# Patient Record
Sex: MALE | Race: WHITE | NOT HISPANIC OR LATINO | Employment: FULL TIME | URBAN - METROPOLITAN AREA
[De-identification: names, ages, dates, MRNs, and addresses within clinical notes are randomized per-mention and may not be internally consistent; named-entity substitution may affect disease eponyms.]

---

## 2017-04-26 ENCOUNTER — HISTORICAL (OUTPATIENT)
Dept: LAB | Facility: HOSPITAL | Age: 57
End: 2017-04-26

## 2018-09-01 ENCOUNTER — HISTORICAL (OUTPATIENT)
Dept: LAB | Facility: HOSPITAL | Age: 58
End: 2018-09-01

## 2018-09-01 LAB
ABS NEUT (OLG): 3.46
ALBUMIN SERPL-MCNC: 3.7 GM/DL (ref 3.4–5)
ALBUMIN/GLOB SERPL: 1.1 RATIO (ref 1.1–2)
ALP SERPL-CCNC: 99 UNIT/L (ref 46–116)
ALT SERPL-CCNC: 50 UNIT/L (ref 12–78)
AST SERPL-CCNC: 24 UNIT/L (ref 10–37)
BASOPHILS # BLD AUTO: 0.05 X10(3)/MCL
BASOPHILS NFR BLD AUTO: 1 %
BILIRUB SERPL-MCNC: 0.5 MG/DL (ref 0.2–1)
BILIRUBIN DIRECT+TOT PNL SERPL-MCNC: 0.12 MG/DL (ref 0–0.2)
BILIRUBIN DIRECT+TOT PNL SERPL-MCNC: 0.38 MG/DL
BUN SERPL-MCNC: 17 MG/DL (ref 7–18)
CALCIUM SERPL-MCNC: 9.3 MG/DL (ref 8.5–10.1)
CHLORIDE SERPL-SCNC: 105 MMOL/L (ref 98–107)
CHOLEST SERPL-MCNC: 234 MG/DL (ref 50–200)
CHOLEST/HDLC SERPL: 5 {RATIO} (ref 0–5)
CO2 SERPL-SCNC: 28.7 MMOL/L (ref 21–32)
CREAT SERPL-MCNC: 0.88 MG/DL (ref 0.7–1.3)
EOSINOPHIL # BLD AUTO: 0.24 X10(3)/MCL
EOSINOPHIL NFR BLD AUTO: 4.6 %
ERYTHROCYTE [DISTWIDTH] IN BLOOD BY AUTOMATED COUNT: 13 %
GLOBULIN SER-MCNC: 3.4 GM/DL (ref 2.4–3.5)
GLUCOSE SERPL-MCNC: 87 MG/DL (ref 74–106)
HCT VFR BLD AUTO: 46.2 % (ref 39–49)
HDLC SERPL-MCNC: 49 MG/DL (ref 35–60)
HGB BLD-MCNC: 15.8 GM/DL (ref 12.6–16.6)
IMM GRANULOCYTES # BLD AUTO: 0 10*3/UL (ref 0–0.1)
IMM GRANULOCYTES NFR BLD AUTO: 0 % (ref 0–1)
LDLC SERPL CALC-MCNC: 172 MG/DL (ref 50–140)
LYMPHOCYTES # BLD AUTO: 1.09 X10(3)/MCL
LYMPHOCYTES NFR BLD AUTO: 21 %
MCH RBC QN AUTO: 29.8 PG (ref 27–33)
MCHC RBC AUTO-ENTMCNC: 34.2 GM/DL (ref 32–35)
MCV RBC AUTO: 87 FL (ref 84–97)
MONOCYTES # BLD AUTO: 0.34 X10(3)/MCL
MONOCYTES NFR BLD AUTO: 6.6 %
NEUTROPHILS # BLD AUTO: 3.46 X10(3)/MCL
NEUTROPHILS NFR BLD AUTO: 66.8 %
PLATELET # BLD AUTO: 233 X10(3)/MCL (ref 151–368)
PMV BLD AUTO: 12 FL
POTASSIUM SERPL-SCNC: 4.2 MMOL/L (ref 3.5–5.1)
PROT SERPL-MCNC: 7.1 GM/DL (ref 6.4–8.2)
PSA SERPL-MCNC: 4.59 NG/ML (ref 0–4)
RBC # BLD AUTO: 5.31 X10(6)/MCL (ref 4.3–5.6)
SODIUM SERPL-SCNC: 140 MMOL/L (ref 136–145)
TRIGL SERPL-MCNC: 66 MG/DL (ref 30–150)
VLDLC SERPL CALC-MCNC: 13 MG/DL
WBC # SPEC AUTO: 5.18 X10(3)/MCL (ref 3.4–9.2)

## 2019-04-24 ENCOUNTER — HISTORICAL (OUTPATIENT)
Dept: LAB | Facility: HOSPITAL | Age: 59
End: 2019-04-24

## 2019-04-24 LAB
ABS NEUT (OLG): 3.34
ALBUMIN SERPL-MCNC: 4 GM/DL (ref 3.4–5)
ALBUMIN/GLOB SERPL: 1.2 RATIO (ref 1.1–2)
ALP SERPL-CCNC: 94 UNIT/L (ref 46–116)
ALT SERPL-CCNC: 36 UNIT/L (ref 12–78)
APTT PPP: 24.7 SECOND(S) (ref 24.5–32.8)
AST SERPL-CCNC: 26 UNIT/L (ref 10–37)
BASOPHILS # BLD AUTO: 0.05 X10(3)/MCL
BASOPHILS NFR BLD AUTO: 1 %
BILIRUB SERPL-MCNC: 0.5 MG/DL (ref 0.2–1)
BILIRUBIN DIRECT+TOT PNL SERPL-MCNC: 0.12 MG/DL (ref 0–0.2)
BILIRUBIN DIRECT+TOT PNL SERPL-MCNC: 0.38 MG/DL
BUN SERPL-MCNC: 15 MG/DL (ref 7–18)
CALCIUM SERPL-MCNC: 9.5 MG/DL (ref 8.5–10.1)
CHLORIDE SERPL-SCNC: 106 MMOL/L (ref 98–107)
CO2 SERPL-SCNC: 31 MMOL/L (ref 21–32)
CREAT SERPL-MCNC: 0.91 MG/DL (ref 0.7–1.3)
EOSINOPHIL # BLD AUTO: 0.24 X10(3)/MCL
EOSINOPHIL NFR BLD AUTO: 4.7 %
ERYTHROCYTE [DISTWIDTH] IN BLOOD BY AUTOMATED COUNT: 13 %
GLOBULIN SER-MCNC: 3.3 GM/DL (ref 2.4–3.5)
GLUCOSE SERPL-MCNC: 93 MG/DL (ref 74–106)
HCT VFR BLD AUTO: 47 % (ref 39–49)
HGB BLD-MCNC: 15.7 GM/DL (ref 12.6–16.6)
IMM GRANULOCYTES # BLD AUTO: 0.01 10*3/UL (ref 0–0.1)
IMM GRANULOCYTES NFR BLD AUTO: 0.2 % (ref 0–1)
INR PPP: 1.1
LYMPHOCYTES # BLD AUTO: 1.02 X10(3)/MCL
LYMPHOCYTES NFR BLD AUTO: 20.2 %
MCH RBC QN AUTO: 29.6 PG (ref 27–33)
MCHC RBC AUTO-ENTMCNC: 33.4 GM/DL (ref 32–35)
MCV RBC AUTO: 88.7 FL (ref 84–97)
MONOCYTES # BLD AUTO: 0.4 X10(3)/MCL
MONOCYTES NFR BLD AUTO: 7.9 %
NEUTROPHILS # BLD AUTO: 3.34 X10(3)/MCL
NEUTROPHILS NFR BLD AUTO: 66 %
PLATELET # BLD AUTO: 264 X10(3)/MCL (ref 151–368)
PMV BLD AUTO: 11 FL
POTASSIUM SERPL-SCNC: 4.7 MMOL/L (ref 3.5–5.1)
PROT SERPL-MCNC: 7.3 GM/DL (ref 6.4–8.2)
PROTHROMBIN TIME: 10.2 SECOND(S) (ref 8.6–10.1)
RBC # BLD AUTO: 5.3 X10(6)/MCL (ref 4.3–5.6)
SODIUM SERPL-SCNC: 142 MMOL/L (ref 136–145)
WBC # SPEC AUTO: 5.06 X10(3)/MCL (ref 3.4–9.2)

## 2021-12-13 ENCOUNTER — HISTORICAL (OUTPATIENT)
Dept: LAB | Facility: HOSPITAL | Age: 61
End: 2021-12-13

## 2021-12-13 LAB
ABS NEUT (OLG): 3.12
ALBUMIN SERPL-MCNC: 3.9 GM/DL (ref 3.5–5)
ALBUMIN/GLOB SERPL: 1.4 RATIO (ref 1.1–2)
ALP SERPL-CCNC: 90 UNIT/L (ref 40–150)
ALT SERPL-CCNC: 24 UNIT/L (ref 0–55)
AST SERPL-CCNC: 20 UNIT/L (ref 5–34)
BASOPHILS # BLD AUTO: 0.03 X10(3)/MCL
BASOPHILS NFR BLD AUTO: 0.7 %
BILIRUB SERPL-MCNC: 0.4 MG/DL
BILIRUBIN DIRECT+TOT PNL SERPL-MCNC: 0.2 MG/DL
BILIRUBIN DIRECT+TOT PNL SERPL-MCNC: 0.2 MG/DL (ref 0–0.5)
BUN SERPL-MCNC: 14 MG/DL (ref 8.4–25.7)
CALCIUM SERPL-MCNC: 9.5 MG/DL (ref 8.7–10.5)
CHLORIDE SERPL-SCNC: 106 MMOL/L (ref 98–107)
CHOLEST SERPL-MCNC: 220 MG/DL
CHOLEST/HDLC SERPL: 5 {RATIO} (ref 0–5)
CO2 SERPL-SCNC: 26 MEQ/L (ref 22–29)
CREAT SERPL-MCNC: 0.86 MG/DL (ref 0.73–1.18)
EOSINOPHIL # BLD AUTO: 0.15 X10(3)/MCL
EOSINOPHIL NFR BLD AUTO: 3.3 %
ERYTHROCYTE [DISTWIDTH] IN BLOOD BY AUTOMATED COUNT: 13 %
GLOBULIN SER-MCNC: 2.8 GM/DL (ref 2.4–3.5)
GLUCOSE SERPL-MCNC: 88 MG/DL (ref 74–100)
HCT VFR BLD AUTO: 47.1 % (ref 39–49)
HDLC SERPL-MCNC: 44 MG/DL (ref 35–60)
HGB BLD-MCNC: 15.3 GM/DL (ref 12.6–16.6)
IMM GRANULOCYTES # BLD AUTO: 0 10*3/UL (ref 0–0.1)
IMM GRANULOCYTES NFR BLD AUTO: 0 % (ref 0–1)
LDLC SERPL CALC-MCNC: 160 MG/DL (ref 50–140)
LYMPHOCYTES # BLD AUTO: 0.85 X10(3)/MCL
LYMPHOCYTES NFR BLD AUTO: 18.4 %
MCH RBC QN AUTO: 28.9 PG (ref 27–33)
MCHC RBC AUTO-ENTMCNC: 32.5 GM/DL (ref 32–35)
MCV RBC AUTO: 88.9 FL (ref 84–97)
MONOCYTES # BLD AUTO: 0.46 X10(3)/MCL
MONOCYTES NFR BLD AUTO: 10 %
NEUTROPHILS # BLD AUTO: 3.12 X10(3)/MCL
NEUTROPHILS NFR BLD AUTO: 67.6 %
PLATELET # BLD AUTO: 221 X10(3)/MCL (ref 140–450)
PMV BLD AUTO: 11 FL
POTASSIUM SERPL-SCNC: 4.2 MMOL/L (ref 3.5–5.1)
PROT SERPL-MCNC: 6.7 GM/DL (ref 6.4–8.3)
PSA SERPL-MCNC: <0.1 NG/ML
RBC # BLD AUTO: 5.3 X10(6)/MCL (ref 4.3–5.6)
SODIUM SERPL-SCNC: 140 MMOL/L (ref 136–145)
TRIGL SERPL-MCNC: 81 MG/DL (ref 34–140)
VLDLC SERPL CALC-MCNC: 16 MG/DL
WBC # SPEC AUTO: 4.61 X10(3)/MCL (ref 3.4–9.2)

## 2022-04-11 ENCOUNTER — HISTORICAL (OUTPATIENT)
Dept: ADMINISTRATIVE | Facility: HOSPITAL | Age: 62
End: 2022-04-11

## 2022-04-27 VITALS
BODY MASS INDEX: 26.67 KG/M2 | WEIGHT: 186.31 LBS | OXYGEN SATURATION: 99 % | SYSTOLIC BLOOD PRESSURE: 110 MMHG | HEIGHT: 70 IN | DIASTOLIC BLOOD PRESSURE: 67 MMHG

## 2022-11-27 RX ORDER — CLOTRIMAZOLE AND BETAMETHASONE DIPROPIONATE 10; .64 MG/G; MG/G
CREAM TOPICAL 2 TIMES DAILY
Qty: 45 G | Refills: 0 | Status: SHIPPED | OUTPATIENT
Start: 2022-11-27 | End: 2022-12-11

## 2022-11-30 DIAGNOSIS — Z11.4 ENCOUNTER FOR SCREENING FOR HIV: ICD-10-CM

## 2022-11-30 DIAGNOSIS — Z13.6 SCREENING FOR ISCHEMIC HEART DISEASE: ICD-10-CM

## 2022-11-30 DIAGNOSIS — Z12.5 SCREENING FOR MALIGNANT NEOPLASM OF PROSTATE: ICD-10-CM

## 2022-11-30 DIAGNOSIS — Z00.00 WELLNESS EXAMINATION: Primary | ICD-10-CM

## 2022-11-30 DIAGNOSIS — Z11.59 NEED FOR HEPATITIS C SCREENING TEST: ICD-10-CM

## 2022-12-12 ENCOUNTER — PATIENT OUTREACH (OUTPATIENT)
Dept: ADMINISTRATIVE | Facility: HOSPITAL | Age: 62
End: 2022-12-12
Payer: COMMERCIAL

## 2023-02-01 ENCOUNTER — LAB VISIT (OUTPATIENT)
Dept: LAB | Facility: HOSPITAL | Age: 63
End: 2023-02-01
Attending: FAMILY MEDICINE
Payer: COMMERCIAL

## 2023-02-01 DIAGNOSIS — Z12.5 SCREENING FOR MALIGNANT NEOPLASM OF PROSTATE: ICD-10-CM

## 2023-02-01 DIAGNOSIS — Z11.59 NEED FOR HEPATITIS C SCREENING TEST: ICD-10-CM

## 2023-02-01 DIAGNOSIS — Z00.00 WELLNESS EXAMINATION: ICD-10-CM

## 2023-02-01 DIAGNOSIS — Z13.6 SCREENING FOR ISCHEMIC HEART DISEASE: ICD-10-CM

## 2023-02-01 LAB
ALBUMIN SERPL-MCNC: 4.2 G/DL (ref 3.4–4.8)
ALBUMIN/GLOB SERPL: 1.4 RATIO (ref 1.1–2)
ALP SERPL-CCNC: 92 UNIT/L (ref 40–150)
ALT SERPL-CCNC: 32 UNIT/L (ref 0–55)
AST SERPL-CCNC: 24 UNIT/L (ref 5–34)
BASOPHILS # BLD AUTO: 0.05 X10(3)/MCL (ref 0–0.2)
BASOPHILS NFR BLD AUTO: 0.9 %
BILIRUBIN DIRECT+TOT PNL SERPL-MCNC: 0.6 MG/DL
BUN SERPL-MCNC: 17 MG/DL (ref 8.4–25.7)
CALCIUM SERPL-MCNC: 10.2 MG/DL (ref 8.8–10)
CHLORIDE SERPL-SCNC: 105 MMOL/L (ref 98–107)
CHOLEST SERPL-MCNC: 270 MG/DL
CHOLEST/HDLC SERPL: 5 {RATIO} (ref 0–5)
CO2 SERPL-SCNC: 30 MMOL/L (ref 23–31)
CREAT SERPL-MCNC: 0.87 MG/DL (ref 0.73–1.18)
EOSINOPHIL # BLD AUTO: 0.25 X10(3)/MCL (ref 0–0.9)
EOSINOPHIL NFR BLD AUTO: 4.7 %
ERYTHROCYTE [DISTWIDTH] IN BLOOD BY AUTOMATED COUNT: 12.6 % (ref 11.5–17)
GFR SERPLBLD CREATININE-BSD FMLA CKD-EPI: >60 MLS/MIN/1.73/M2
GLOBULIN SER-MCNC: 3.1 GM/DL (ref 2.4–3.5)
GLUCOSE SERPL-MCNC: 90 MG/DL (ref 82–115)
HCT VFR BLD AUTO: 50.4 % (ref 42–52)
HCV AB SERPL QL IA: NONREACTIVE
HDLC SERPL-MCNC: 51 MG/DL (ref 35–60)
HGB BLD-MCNC: 16.7 GM/DL (ref 14–18)
IMM GRANULOCYTES # BLD AUTO: 0.01 X10(3)/MCL (ref 0–0.04)
IMM GRANULOCYTES NFR BLD AUTO: 0.2 %
LDLC SERPL CALC-MCNC: 199 MG/DL (ref 50–140)
LYMPHOCYTES # BLD AUTO: 0.9 X10(3)/MCL (ref 0.6–4.6)
LYMPHOCYTES NFR BLD AUTO: 17 %
MCH RBC QN AUTO: 29.7 PG
MCHC RBC AUTO-ENTMCNC: 33.1 MG/DL (ref 33–36)
MCV RBC AUTO: 89.7 FL (ref 80–94)
MONOCYTES # BLD AUTO: 0.36 X10(3)/MCL (ref 0.1–1.3)
MONOCYTES NFR BLD AUTO: 6.8 %
NEUTROPHILS # BLD AUTO: 3.72 X10(3)/MCL (ref 2.1–9.2)
NEUTROPHILS NFR BLD AUTO: 70.4 %
NRBC BLD AUTO-RTO: 0 %
PLATELET # BLD AUTO: 255 X10(3)/MCL (ref 130–400)
PMV BLD AUTO: 11.3 FL (ref 7.4–10.4)
POTASSIUM SERPL-SCNC: 3.9 MMOL/L (ref 3.5–5.1)
PROT SERPL-MCNC: 7.3 GM/DL (ref 5.8–7.6)
PSA SERPL-MCNC: <0.02 NG/ML
RBC # BLD AUTO: 5.62 X10(6)/MCL (ref 4.7–6.1)
SODIUM SERPL-SCNC: 143 MMOL/L (ref 136–145)
TRIGL SERPL-MCNC: 100 MG/DL (ref 34–140)
VLDLC SERPL CALC-MCNC: 20 MG/DL
WBC # SPEC AUTO: 5.3 X10(3)/MCL (ref 4.5–11.5)

## 2023-02-01 PROCEDURE — 80053 COMPREHEN METABOLIC PANEL: CPT

## 2023-02-01 PROCEDURE — 80061 LIPID PANEL: CPT

## 2023-02-01 PROCEDURE — 84153 ASSAY OF PSA TOTAL: CPT

## 2023-02-01 PROCEDURE — 86803 HEPATITIS C AB TEST: CPT

## 2023-02-01 PROCEDURE — 85025 COMPLETE CBC W/AUTO DIFF WBC: CPT

## 2023-02-01 PROCEDURE — 36415 COLL VENOUS BLD VENIPUNCTURE: CPT

## 2023-02-01 RX ORDER — ROSUVASTATIN CALCIUM 5 MG/1
5 TABLET, COATED ORAL DAILY
Qty: 30 TABLET | Refills: 6 | Status: SHIPPED | OUTPATIENT
Start: 2023-02-01 | End: 2023-05-31 | Stop reason: SDUPTHER

## 2023-02-01 NOTE — PROGRESS NOTES
Please inform patient of results.     1. Rx for Crestor 5 mg q.h.s. sent to pharmacy.  Repeat FLP in 3 months    Other labwork within acceptable ranges.

## 2023-03-07 ENCOUNTER — OFFICE VISIT (OUTPATIENT)
Dept: FAMILY MEDICINE | Facility: CLINIC | Age: 63
End: 2023-03-07
Payer: COMMERCIAL

## 2023-03-07 VITALS
BODY MASS INDEX: 26.6 KG/M2 | HEIGHT: 70 IN | RESPIRATION RATE: 18 BRPM | WEIGHT: 185.81 LBS | SYSTOLIC BLOOD PRESSURE: 118 MMHG | HEART RATE: 61 BPM | DIASTOLIC BLOOD PRESSURE: 70 MMHG | TEMPERATURE: 97 F | OXYGEN SATURATION: 98 %

## 2023-03-07 DIAGNOSIS — C61 MALIGNANT NEOPLASM OF PROSTATE: ICD-10-CM

## 2023-03-07 DIAGNOSIS — Z00.00 WELLNESS EXAMINATION: Primary | ICD-10-CM

## 2023-03-07 PROCEDURE — 3008F BODY MASS INDEX DOCD: CPT | Mod: CPTII,,, | Performed by: FAMILY MEDICINE

## 2023-03-07 PROCEDURE — 3078F DIAST BP <80 MM HG: CPT | Mod: CPTII,,, | Performed by: FAMILY MEDICINE

## 2023-03-07 PROCEDURE — 3074F PR MOST RECENT SYSTOLIC BLOOD PRESSURE < 130 MM HG: ICD-10-PCS | Mod: CPTII,,, | Performed by: FAMILY MEDICINE

## 2023-03-07 PROCEDURE — 1160F RVW MEDS BY RX/DR IN RCRD: CPT | Mod: CPTII,,, | Performed by: FAMILY MEDICINE

## 2023-03-07 PROCEDURE — 1159F PR MEDICATION LIST DOCUMENTED IN MEDICAL RECORD: ICD-10-PCS | Mod: CPTII,,, | Performed by: FAMILY MEDICINE

## 2023-03-07 PROCEDURE — 3008F PR BODY MASS INDEX (BMI) DOCUMENTED: ICD-10-PCS | Mod: CPTII,,, | Performed by: FAMILY MEDICINE

## 2023-03-07 PROCEDURE — 1159F MED LIST DOCD IN RCRD: CPT | Mod: CPTII,,, | Performed by: FAMILY MEDICINE

## 2023-03-07 PROCEDURE — 3078F PR MOST RECENT DIASTOLIC BLOOD PRESSURE < 80 MM HG: ICD-10-PCS | Mod: CPTII,,, | Performed by: FAMILY MEDICINE

## 2023-03-07 PROCEDURE — 1160F PR REVIEW ALL MEDS BY PRESCRIBER/CLIN PHARMACIST DOCUMENTED: ICD-10-PCS | Mod: CPTII,,, | Performed by: FAMILY MEDICINE

## 2023-03-07 PROCEDURE — 99396 PREV VISIT EST AGE 40-64: CPT | Mod: ,,, | Performed by: FAMILY MEDICINE

## 2023-03-07 PROCEDURE — 99396 PR PREVENTIVE VISIT,EST,40-64: ICD-10-PCS | Mod: ,,, | Performed by: FAMILY MEDICINE

## 2023-03-07 PROCEDURE — 3074F SYST BP LT 130 MM HG: CPT | Mod: CPTII,,, | Performed by: FAMILY MEDICINE

## 2023-03-07 NOTE — PROGRESS NOTES
"Subjective:       Patient ID: Vinny Georges Jr is a 62 y.o. male.    Chief Complaint: Wellness      Wellness      Review of Systems   Constitutional: Negative.    Respiratory: Negative.     Cardiovascular: Negative.    Gastrointestinal: Negative.    Genitourinary:         Prostate cancer: Followed by Dr. Jernigan   Psychiatric/Behavioral: Negative.           Objective:      /70 (BP Location: Left arm, Patient Position: Sitting, BP Method: Large (Automatic))   Pulse 61   Temp 97.2 °F (36.2 °C)   Resp 18   Ht 5' 10" (1.778 m)   Wt 84.3 kg (185 lb 12.8 oz)   SpO2 98%   BMI 26.66 kg/m²    Physical Exam  Constitutional:       General: He is not in acute distress.     Appearance: Normal appearance.   Cardiovascular:      Rate and Rhythm: Normal rate and regular rhythm.   Pulmonary:      Effort: Pulmonary effort is normal.      Breath sounds: Normal breath sounds.   Abdominal:      General: Abdomen is flat. Bowel sounds are normal.      Palpations: Abdomen is soft.   Musculoskeletal:         General: Normal range of motion.   Neurological:      Mental Status: He is alert.   Psychiatric:         Mood and Affect: Mood normal.         Behavior: Behavior normal.         Thought Content: Thought content normal.         Judgment: Judgment normal.           Recent Results (from the past 2016 hour(s))   Comprehensive Metabolic Panel    Collection Time: 02/01/23  7:02 AM   Result Value Ref Range    Sodium Level 143 136 - 145 mmol/L    Potassium Level 3.9 3.5 - 5.1 mmol/L    Chloride 105 98 - 107 mmol/L    Carbon Dioxide 30 23 - 31 mmol/L    Glucose Level 90 82 - 115 mg/dL    Blood Urea Nitrogen 17.0 8.4 - 25.7 mg/dL    Creatinine 0.87 0.73 - 1.18 mg/dL    Calcium Level Total 10.2 (H) 8.8 - 10.0 mg/dL    Protein Total 7.3 5.8 - 7.6 gm/dL    Albumin Level 4.2 3.4 - 4.8 g/dL    Globulin 3.1 2.4 - 3.5 gm/dL    Albumin/Globulin Ratio 1.4 1.1 - 2.0 ratio    Bilirubin Total 0.6 <=1.5 mg/dL    Alkaline Phosphatase 92 40 - " 150 unit/L    Alanine Aminotransferase 32 0 - 55 unit/L    Aspartate Aminotransferase 24 5 - 34 unit/L    eGFR >60 mls/min/1.73/m2   Hepatitis C Antibody    Collection Time: 02/01/23  7:02 AM   Result Value Ref Range    Hepatitis C Antibody Nonreactive Nonreactive   Lipid Panel    Collection Time: 02/01/23  7:02 AM   Result Value Ref Range    Cholesterol Total 270 (H) <=200 mg/dL    HDL Cholesterol 51 35 - 60 mg/dL    Triglyceride 100 34 - 140 mg/dL    Cholesterol/HDL Ratio 5 0 - 5    Very Low Density Lipoprotein 20     LDL Cholesterol 199.00 (H) 50.00 - 140.00 mg/dL   PSA, Screening    Collection Time: 02/01/23  7:02 AM   Result Value Ref Range    Prostate Specific Antigen <0.02 <=4.00 ng/mL   CBC with Differential    Collection Time: 02/01/23  7:02 AM   Result Value Ref Range    WBC 5.3 4.5 - 11.5 x10(3)/mcL    RBC 5.62 4.70 - 6.10 x10(6)/mcL    Hgb 16.7 14.0 - 18.0 gm/dL    Hct 50.4 42.0 - 52.0 %    MCV 89.7 80.0 - 94.0 fL    MCH 29.7 pg    MCHC 33.1 33.0 - 36.0 mg/dL    RDW 12.6 11.5 - 17.0 %    Platelet 255 130 - 400 x10(3)/mcL    MPV 11.3 (H) 7.4 - 10.4 fL    Neut % 70.4 %    Lymph % 17.0 %    Mono % 6.8 %    Eos % 4.7 %    Basophil % 0.9 %    Lymph # 0.90 0.6 - 4.6 x10(3)/mcL    Neut # 3.72 2.1 - 9.2 x10(3)/mcL    Mono # 0.36 0.1 - 1.3 x10(3)/mcL    Eos # 0.25 0 - 0.9 x10(3)/mcL    Baso # 0.05 0 - 0.2 x10(3)/mcL    IG# 0.01 0 - 0.04 x10(3)/mcL    IG% 0.2 %    NRBC% 0.0 %      Assessment:       Problem List Items Addressed This Visit    None  Visit Diagnoses       Wellness examination    -  Primary    Malignant neoplasm of prostate                   Plan:   1. Wellness examination  Lab work discussed with patient  Continue current medication  Continue diet/exercise  Return to clinic with any concerns    2. Malignant neoplasm of prostate  Keep scheduled appointment with Dr Jernigan

## 2023-05-31 DIAGNOSIS — E78.6 CHOLESTEROL DEPLETION: Primary | ICD-10-CM

## 2023-05-31 RX ORDER — ROSUVASTATIN CALCIUM 5 MG/1
5 TABLET, COATED ORAL DAILY
Qty: 30 TABLET | Refills: 6 | Status: SHIPPED | OUTPATIENT
Start: 2023-05-31 | End: 2023-06-01 | Stop reason: SDUPTHER

## 2023-06-01 DIAGNOSIS — E78.6 CHOLESTEROL DEPLETION: ICD-10-CM

## 2023-06-01 RX ORDER — ROSUVASTATIN CALCIUM 5 MG/1
5 TABLET, COATED ORAL DAILY
Qty: 30 TABLET | Refills: 3 | Status: SHIPPED | OUTPATIENT
Start: 2023-06-01 | End: 2024-03-04

## 2023-06-15 ENCOUNTER — HOSPITAL ENCOUNTER (EMERGENCY)
Facility: HOSPITAL | Age: 63
Discharge: HOME | End: 2023-06-15
Attending: GENERAL PRACTICE
Payer: COMMERCIAL

## 2023-06-15 VITALS
HEIGHT: 70 IN | WEIGHT: 186 LBS | OXYGEN SATURATION: 97 % | DIASTOLIC BLOOD PRESSURE: 78 MMHG | RESPIRATION RATE: 16 BRPM | SYSTOLIC BLOOD PRESSURE: 148 MMHG | TEMPERATURE: 98 F | HEART RATE: 78 BPM | BODY MASS INDEX: 26.63 KG/M2

## 2023-06-15 DIAGNOSIS — S81.012A KNEE LACERATION, LEFT, INITIAL ENCOUNTER: Primary | ICD-10-CM

## 2023-06-15 PROCEDURE — 12004 RPR S/N/AX/GEN/TRK7.6-12.5CM: CPT

## 2023-06-15 PROCEDURE — 90471 IMMUNIZATION ADMIN: CPT | Performed by: GENERAL PRACTICE

## 2023-06-15 PROCEDURE — 99284 EMERGENCY DEPT VISIT MOD MDM: CPT | Mod: 25

## 2023-06-15 PROCEDURE — 90715 TDAP VACCINE 7 YRS/> IM: CPT | Performed by: GENERAL PRACTICE

## 2023-06-15 PROCEDURE — 25000003 PHARM REV CODE 250: Performed by: GENERAL PRACTICE

## 2023-06-15 PROCEDURE — 63600175 PHARM REV CODE 636 W HCPCS: Performed by: GENERAL PRACTICE

## 2023-06-15 RX ORDER — DOXYCYCLINE 100 MG/1
100 CAPSULE ORAL 2 TIMES DAILY
Qty: 20 CAPSULE | Refills: 0 | Status: SHIPPED | OUTPATIENT
Start: 2023-06-15 | End: 2023-06-25

## 2023-06-15 RX ORDER — DOXYCYCLINE HYCLATE 100 MG
100 TABLET ORAL
Status: COMPLETED | OUTPATIENT
Start: 2023-06-15 | End: 2023-06-15

## 2023-06-15 RX ORDER — LIDOCAINE HYDROCHLORIDE AND EPINEPHRINE 10; 10 MG/ML; UG/ML
10 INJECTION, SOLUTION INFILTRATION; PERINEURAL ONCE
Status: COMPLETED | OUTPATIENT
Start: 2023-06-15 | End: 2023-06-15

## 2023-06-15 RX ORDER — NABUMETONE 500 MG/1
500 TABLET, FILM COATED ORAL 2 TIMES DAILY PRN
Qty: 20 TABLET | Refills: 0 | Status: SHIPPED | OUTPATIENT
Start: 2023-06-15

## 2023-06-15 RX ADMIN — TETANUS TOXOID, REDUCED DIPHTHERIA TOXOID AND ACELLULAR PERTUSSIS VACCINE, ADSORBED 0.5 ML: 5; 2.5; 8; 8; 2.5 SUSPENSION INTRAMUSCULAR at 09:06

## 2023-06-15 RX ADMIN — DOXYCYCLINE HYCLATE 100 MG: 100 TABLET, COATED ORAL at 10:06

## 2023-06-15 RX ADMIN — LIDOCAINE HYDROCHLORIDE,EPINEPHRINE BITARTRATE 10 ML: 10; .01 INJECTION, SOLUTION INFILTRATION; PERINEURAL at 09:06

## 2023-06-16 NOTE — ED PROVIDER NOTES
Encounter Date: 6/15/2023       History     Chief Complaint   Patient presents with    Laceration     Pt arrived to ED via PV c/o laceration to lf knee, bleeding controlled, after being hit by a branch while cutting down a tree, pt not on anti-coagulation therapy at this time, last tetanus unknown     Pt arrived to ED via PV c/o laceration to lf knee, bleeding controlled, after being hit by a branch while cutting down a tree, pt not on anti-coagulation therapy at this time, last tetanus unknown    The history is provided by the patient.   Laceration   The incident occurred just prior to arrival. The laceration is located on the Left leg. The laceration is 9 cm in size. The laceration mechanism was a a clean knife and a dirty knife. The pain is at a severity of 4/10. The pain has been Constant since onset. He reports no foreign bodies present. His tetanus status is unknown.   Review of patient's allergies indicates:   Allergen Reactions    Penicillins      History reviewed. No pertinent past medical history.  Past Surgical History:   Procedure Laterality Date    PROSTATE SURGERY  06/01/2019    SHOULDER SURGERY Right 06/01/2020     History reviewed. No pertinent family history.  Social History     Tobacco Use    Smoking status: Never    Smokeless tobacco: Never   Substance Use Topics    Drug use: Never     Review of Systems   Constitutional: Negative.    HENT: Negative.     Eyes: Negative.    Respiratory: Negative.     Cardiovascular: Negative.    Gastrointestinal: Negative.    Endocrine: Negative.    Genitourinary: Negative.    Musculoskeletal: Negative.    Skin:  Positive for wound.   Allergic/Immunologic: Negative.    Neurological: Negative.    Hematological: Negative.    Psychiatric/Behavioral: Negative.     All other systems reviewed and are negative.    Physical Exam     Initial Vitals [06/15/23 2112]   BP Pulse Resp Temp SpO2   (!) 151/81 60 16 98 °F (36.7 °C) 97 %      MAP       --         Physical  Exam    Nursing note and vitals reviewed.  Constitutional: He appears well-developed and well-nourished.   HENT:   Head: Normocephalic and atraumatic.   Eyes: EOM are normal. Pupils are equal, round, and reactive to light.   Neck: Neck supple.   Normal range of motion.  Cardiovascular:  Normal rate, regular rhythm, normal heart sounds and intact distal pulses.           Abdominal: Abdomen is soft. Bowel sounds are normal.   Musculoskeletal:         General: Normal range of motion.      Cervical back: Normal range of motion and neck supple.     Neurological: He is alert and oriented to person, place, and time. He has normal strength. GCS score is 15. GCS eye subscore is 4. GCS verbal subscore is 5. GCS motor subscore is 6.   Skin: Skin is warm. Laceration noted.        9-10 cm linear laceration going through to the subcutaneous noted.  There does not appear to be any bony injury.  Full range of motion.  There is slightly contused edges and an abrasion inferiorly.   Psychiatric: He has a normal mood and affect. His behavior is normal. Judgment and thought content normal.       ED Course   Lac Repair    Date/Time: 6/15/2023 10:33 PM  Performed by: Jim Jaimes MD  Authorized by: Jim Jaimes MD     Consent:     Consent obtained:  Emergent situation    Consent given by:  Patient    Risks, benefits, and alternatives were discussed: yes      Risks discussed:  Infection, pain and poor cosmetic result    Alternatives discussed:  No treatment  Universal protocol:     Procedure explained and questions answered to patient or proxy's satisfaction: yes      Relevant documents present and verified: yes      Imaging studies available: yes      Site/side marked: yes      Immediately prior to procedure, a time out was called: yes      Patient identity confirmed:  Verbally with patient, hospital-assigned identification number and arm band  Anesthesia:     Anesthesia method:  Local infiltration    Local anesthetic:  Lidocaine 1%  WITH epi  Laceration details:     Location:  Leg    Leg location:  L knee    Length (cm):  10    Depth (mm):  1  Pre-procedure details:     Preparation:  Patient was prepped and draped in usual sterile fashion and imaging obtained to evaluate for foreign bodies  Exploration:     Hemostasis achieved with:  Direct pressure    Imaging obtained: x-ray      Imaging outcome: foreign body not noted      Wound exploration: wound explored through full range of motion and entire depth of wound visualized      Contaminated: no    Treatment:     Area cleansed with:  Chlorhexidine    Amount of cleaning:  Extensive    Irrigation solution:  Sterile saline    Irrigation volume:  20    Irrigation method:  Syringe    Debridement:  None    Undermining:  None    Scar revision: no    Skin repair:     Repair method:  Sutures    Suture size:  2-0    Suture material:  Nylon    Suture technique:  Horizontal mattress  Approximation:     Approximation:  Close  Repair type:     Repair type:  Simple  Post-procedure details:     Dressing:  Antibiotic ointment and sterile dressing    Procedure completion:  Tolerated well, no immediate complications  Labs Reviewed - No data to display       Imaging Results              X-Ray Knee Complete 4 or More Views Left (Preliminary result)  Result time 06/15/23 22:24:03   Procedure changed from X-Ray Knee 3 View Left     Wet Read by Jim Jaimes MD (06/15/23 22:24:03, [facility] - Emergency Dept, Emergency Medicine)    No acute fracture dislocation.  Soft tissue laceration noted above the patella.                                     Medications   LIDOcaine-EPINEPHrine 1%-1:100,000 injection 10 mL (10 mLs Subcutaneous Given 6/15/23 2155)   Tdap (BOOSTRIX) vaccine injection 0.5 mL (0.5 mLs Intramuscular Given 6/15/23 2151)   doxycycline tablet 100 mg (100 mg Oral Given 6/15/23 2230)                              Clinical Impression:   Final diagnoses:  [S81.012A] Knee laceration, left, initial  encounter (Primary)        ED Disposition Condition    Discharge Stable          ED Prescriptions       Medication Sig Dispense Start Date End Date Auth. Provider    doxycycline (VIBRAMYCIN) 100 MG Cap Take 1 capsule (100 mg total) by mouth 2 (two) times daily. for 10 days 20 capsule 6/15/2023 6/25/2023 Jim Jaimes MD    nabumetone (RELAFEN) 500 MG tablet Take 1 tablet (500 mg total) by mouth 2 (two) times daily as needed for Pain (Take with food). 20 tablet 6/15/2023 -- Jim Jaimes MD          Follow-up Information       Follow up With Specialties Details Why Contact Info    Danial Morgan MD Family Medicine Schedule an appointment as soon as possible for a visit in 1 week For suture removal [address] [place] [id number]  [phone]               Jim Jaimes MD  06/15/23 7207

## 2023-08-10 RX ORDER — CLINDAMYCIN HYDROCHLORIDE 150 MG/1
150 CAPSULE ORAL EVERY 6 HOURS
Qty: 28 CAPSULE | Refills: 0 | Status: SHIPPED | OUTPATIENT
Start: 2023-08-10 | End: 2023-08-10

## 2023-08-10 RX ORDER — NIRMATRELVIR AND RITONAVIR 300-100 MG
KIT ORAL
Qty: 30 TABLET | Refills: 0 | Status: SHIPPED | OUTPATIENT
Start: 2023-08-10 | End: 2023-08-15

## 2024-01-03 DIAGNOSIS — R19.7 DIARRHEA, UNSPECIFIED TYPE: Primary | ICD-10-CM

## 2024-01-04 ENCOUNTER — LAB VISIT (OUTPATIENT)
Dept: LAB | Facility: HOSPITAL | Age: 64
End: 2024-01-04
Attending: FAMILY MEDICINE
Payer: COMMERCIAL

## 2024-01-04 DIAGNOSIS — R19.7 DIARRHEA, UNSPECIFIED TYPE: ICD-10-CM

## 2024-01-04 LAB
C DIFF TOX A+B STL QL IA: NEGATIVE
CLOSTRIDIUM DIFFICILE GDH ANTIGEN (OHS): NEGATIVE

## 2024-01-04 PROCEDURE — 87045 FECES CULTURE AEROBIC BACT: CPT

## 2024-01-04 PROCEDURE — 86318 IA INFECTIOUS AGENT ANTIBODY: CPT

## 2024-01-04 RX ORDER — LEVOFLOXACIN 500 MG/1
500 TABLET, FILM COATED ORAL DAILY
Qty: 5 TABLET | Refills: 0 | Status: SHIPPED | OUTPATIENT
Start: 2024-01-04 | End: 2024-01-09

## 2024-01-06 LAB — BACTERIA STL CULT: NORMAL

## 2024-02-19 ENCOUNTER — OFFICE VISIT (OUTPATIENT)
Dept: FAMILY MEDICINE | Facility: CLINIC | Age: 64
End: 2024-02-19
Payer: COMMERCIAL

## 2024-02-19 VITALS
DIASTOLIC BLOOD PRESSURE: 74 MMHG | WEIGHT: 183 LBS | TEMPERATURE: 97 F | SYSTOLIC BLOOD PRESSURE: 138 MMHG | HEART RATE: 57 BPM | BODY MASS INDEX: 26.2 KG/M2 | OXYGEN SATURATION: 95 % | RESPIRATION RATE: 18 BRPM | HEIGHT: 70 IN

## 2024-02-19 DIAGNOSIS — R19.7 DIARRHEA, UNSPECIFIED TYPE: Primary | ICD-10-CM

## 2024-02-19 DIAGNOSIS — C61 MALIGNANT NEOPLASM OF PROSTATE: ICD-10-CM

## 2024-02-19 PROCEDURE — 3075F SYST BP GE 130 - 139MM HG: CPT | Mod: CPTII,,, | Performed by: FAMILY MEDICINE

## 2024-02-19 PROCEDURE — 1159F MED LIST DOCD IN RCRD: CPT | Mod: CPTII,,, | Performed by: FAMILY MEDICINE

## 2024-02-19 PROCEDURE — 3008F BODY MASS INDEX DOCD: CPT | Mod: CPTII,,, | Performed by: FAMILY MEDICINE

## 2024-02-19 PROCEDURE — 3078F DIAST BP <80 MM HG: CPT | Mod: CPTII,,, | Performed by: FAMILY MEDICINE

## 2024-02-19 PROCEDURE — 1160F RVW MEDS BY RX/DR IN RCRD: CPT | Mod: CPTII,,, | Performed by: FAMILY MEDICINE

## 2024-02-19 PROCEDURE — 99214 OFFICE O/P EST MOD 30 MIN: CPT | Mod: ,,, | Performed by: FAMILY MEDICINE

## 2024-02-19 NOTE — PROGRESS NOTES
"Subjective:       Patient ID: Vinny Georges Jr is a 63 y.o. male.    Chief Complaint: GI complaints       Diarrhea          Review of Systems   Constitutional: Negative.    Respiratory: Negative.     Cardiovascular: Negative.    Gastrointestinal:  Positive for diarrhea (2-3 x/day). Negative for blood in stool and constipation.           Objective:      /74 (BP Location: Left arm, Patient Position: Sitting, BP Method: Large (Manual))   Pulse (!) 57   Temp 97 °F (36.1 °C)   Resp 18   Ht 5' 10" (1.778 m)   Wt 83 kg (183 lb)   SpO2 95%   BMI 26.26 kg/m²    Physical Exam  Constitutional:       Appearance: Normal appearance.   Cardiovascular:      Rate and Rhythm: Normal rate and regular rhythm.      Heart sounds: Normal heart sounds.   Pulmonary:      Effort: Pulmonary effort is normal.      Breath sounds: Normal breath sounds.   Abdominal:      General: Abdomen is flat. Bowel sounds are normal.      Palpations: Abdomen is soft.   Neurological:      Mental Status: He is alert.   Psychiatric:         Mood and Affect: Mood normal.         Behavior: Behavior normal.         Thought Content: Thought content normal.         Judgment: Judgment normal.               Assessment:       Problem List Items Addressed This Visit          Oncology    Prostate cancer    Current Assessment & Plan     Followed by Dr. Jernigan          Other Visit Diagnoses       Diarrhea, unspecified type    -  Primary    Relevant Orders    Ambulatory referral/consult to Gastroenterology               Plan:   1. Diarrhea, unspecified type  -     Ambulatory referral/consult to Gastroenterology; Future; Expected date: 02/26/2024  Refer patient to Dr. Adams   [facility] meds p.r.n.  Monitor   Return to clinic with any concerns    2. Malignant neoplasm of prostate  Assessment & Plan:  Followed by Dr. Jernigan                 "

## 2024-03-04 DIAGNOSIS — E78.6 CHOLESTEROL DEPLETION: ICD-10-CM

## 2024-03-04 RX ORDER — ROSUVASTATIN CALCIUM 5 MG/1
5 TABLET, COATED ORAL
Qty: 90 TABLET | Refills: 1 | Status: SHIPPED | OUTPATIENT
Start: 2024-03-04 | End: 2024-03-20 | Stop reason: SDUPTHER

## 2024-03-08 DIAGNOSIS — Z11.59 NEED FOR HEPATITIS C SCREENING TEST: ICD-10-CM

## 2024-03-08 DIAGNOSIS — Z00.00 WELLNESS EXAMINATION: ICD-10-CM

## 2024-03-08 DIAGNOSIS — Z11.4 ENCOUNTER FOR HIV (HUMAN IMMUNODEFICIENCY VIRUS) TEST: Primary | ICD-10-CM

## 2024-03-08 DIAGNOSIS — Z13.6 SCREENING FOR ISCHEMIC HEART DISEASE: ICD-10-CM

## 2024-03-08 DIAGNOSIS — Z12.5 SCREENING FOR MALIGNANT NEOPLASM OF PROSTATE: ICD-10-CM

## 2024-03-20 DIAGNOSIS — E78.6 CHOLESTEROL DEPLETION: ICD-10-CM

## 2024-03-20 RX ORDER — ROSUVASTATIN CALCIUM 5 MG/1
5 TABLET, COATED ORAL DAILY
Qty: 90 TABLET | Refills: 1 | Status: SHIPPED | OUTPATIENT
Start: 2024-03-20 | End: 2024-03-22 | Stop reason: SDUPTHER

## 2024-03-22 ENCOUNTER — OFFICE VISIT (OUTPATIENT)
Dept: FAMILY MEDICINE | Facility: CLINIC | Age: 64
End: 2024-03-22
Payer: COMMERCIAL

## 2024-03-22 ENCOUNTER — LAB VISIT (OUTPATIENT)
Dept: LAB | Facility: HOSPITAL | Age: 64
End: 2024-03-22
Attending: FAMILY MEDICINE
Payer: COMMERCIAL

## 2024-03-22 VITALS
HEIGHT: 70 IN | TEMPERATURE: 97 F | RESPIRATION RATE: 18 BRPM | SYSTOLIC BLOOD PRESSURE: 128 MMHG | BODY MASS INDEX: 26.05 KG/M2 | OXYGEN SATURATION: 98 % | HEART RATE: 60 BPM | WEIGHT: 182 LBS | DIASTOLIC BLOOD PRESSURE: 78 MMHG

## 2024-03-22 DIAGNOSIS — Z11.59 NEED FOR HEPATITIS C SCREENING TEST: ICD-10-CM

## 2024-03-22 DIAGNOSIS — Z11.4 ENCOUNTER FOR HIV (HUMAN IMMUNODEFICIENCY VIRUS) TEST: ICD-10-CM

## 2024-03-22 DIAGNOSIS — Z00.00 WELLNESS EXAMINATION: ICD-10-CM

## 2024-03-22 DIAGNOSIS — Z00.00 WELLNESS EXAMINATION: Primary | ICD-10-CM

## 2024-03-22 DIAGNOSIS — E78.49 OTHER HYPERLIPIDEMIA: ICD-10-CM

## 2024-03-22 DIAGNOSIS — Z12.5 SCREENING FOR MALIGNANT NEOPLASM OF PROSTATE: ICD-10-CM

## 2024-03-22 DIAGNOSIS — Z13.6 SCREENING FOR ISCHEMIC HEART DISEASE: ICD-10-CM

## 2024-03-22 LAB
ALBUMIN SERPL-MCNC: 4.1 G/DL (ref 3.4–4.8)
ALBUMIN/GLOB SERPL: 1.4 RATIO (ref 1.1–2)
ALP SERPL-CCNC: 97 UNIT/L (ref 40–150)
ALT SERPL-CCNC: 26 UNIT/L (ref 0–55)
AST SERPL-CCNC: 24 UNIT/L (ref 5–34)
BASOPHILS # BLD AUTO: 0.06 X10(3)/MCL
BASOPHILS NFR BLD AUTO: 1 %
BILIRUB SERPL-MCNC: 0.6 MG/DL
BUN SERPL-MCNC: 18 MG/DL (ref 8.4–25.7)
CALCIUM SERPL-MCNC: 9.7 MG/DL (ref 8.8–10)
CHLORIDE SERPL-SCNC: 108 MMOL/L (ref 98–107)
CHOLEST SERPL-MCNC: 230 MG/DL
CHOLEST/HDLC SERPL: 5 {RATIO} (ref 0–5)
CO2 SERPL-SCNC: 28 MMOL/L (ref 23–31)
CREAT SERPL-MCNC: 0.91 MG/DL (ref 0.73–1.18)
EOSINOPHIL # BLD AUTO: 0.31 X10(3)/MCL (ref 0–0.9)
EOSINOPHIL NFR BLD AUTO: 5.4 %
ERYTHROCYTE [DISTWIDTH] IN BLOOD BY AUTOMATED COUNT: 12.8 % (ref 11.5–17)
GFR SERPLBLD CREATININE-BSD FMLA CKD-EPI: >60 MLS/MIN/1.73/M2
GLOBULIN SER-MCNC: 3 GM/DL (ref 2.4–3.5)
GLUCOSE SERPL-MCNC: 88 MG/DL (ref 82–115)
HCT VFR BLD AUTO: 48.4 % (ref 42–52)
HCV AB SERPL QL IA: NONREACTIVE
HDLC SERPL-MCNC: 51 MG/DL (ref 35–60)
HGB BLD-MCNC: 16 G/DL (ref 14–18)
HIV 1+2 AB+HIV1 P24 AG SERPL QL IA: NONREACTIVE
IMM GRANULOCYTES # BLD AUTO: 0.01 X10(3)/MCL (ref 0–0.04)
IMM GRANULOCYTES NFR BLD AUTO: 0.2 %
LDLC SERPL CALC-MCNC: 163 MG/DL (ref 50–140)
LYMPHOCYTES # BLD AUTO: 1.06 X10(3)/MCL (ref 0.6–4.6)
LYMPHOCYTES NFR BLD AUTO: 18.5 %
MCH RBC QN AUTO: 29.5 PG (ref 27–31)
MCHC RBC AUTO-ENTMCNC: 33.1 G/DL (ref 33–36)
MCV RBC AUTO: 89.1 FL (ref 80–94)
MONOCYTES # BLD AUTO: 0.46 X10(3)/MCL (ref 0.1–1.3)
MONOCYTES NFR BLD AUTO: 8 %
NEUTROPHILS # BLD AUTO: 3.82 X10(3)/MCL (ref 2.1–9.2)
NEUTROPHILS NFR BLD AUTO: 66.9 %
NRBC BLD AUTO-RTO: 0 %
PLATELET # BLD AUTO: 251 X10(3)/MCL (ref 130–400)
PMV BLD AUTO: 11.4 FL (ref 7.4–10.4)
POTASSIUM SERPL-SCNC: 4 MMOL/L (ref 3.5–5.1)
PROT SERPL-MCNC: 7.1 GM/DL (ref 5.8–7.6)
PSA SERPL-MCNC: <0.1 NG/ML
RBC # BLD AUTO: 5.43 X10(6)/MCL (ref 4.7–6.1)
SODIUM SERPL-SCNC: 144 MMOL/L (ref 136–145)
TRIGL SERPL-MCNC: 80 MG/DL (ref 34–140)
VLDLC SERPL CALC-MCNC: 16 MG/DL
WBC # SPEC AUTO: 5.72 X10(3)/MCL (ref 4.5–11.5)

## 2024-03-22 PROCEDURE — 86803 HEPATITIS C AB TEST: CPT

## 2024-03-22 PROCEDURE — 3074F SYST BP LT 130 MM HG: CPT | Mod: CPTII,,, | Performed by: FAMILY MEDICINE

## 2024-03-22 PROCEDURE — 36415 COLL VENOUS BLD VENIPUNCTURE: CPT

## 2024-03-22 PROCEDURE — 80061 LIPID PANEL: CPT

## 2024-03-22 PROCEDURE — 1159F MED LIST DOCD IN RCRD: CPT | Mod: CPTII,,, | Performed by: FAMILY MEDICINE

## 2024-03-22 PROCEDURE — 3078F DIAST BP <80 MM HG: CPT | Mod: CPTII,,, | Performed by: FAMILY MEDICINE

## 2024-03-22 PROCEDURE — 99396 PREV VISIT EST AGE 40-64: CPT | Mod: ,,, | Performed by: FAMILY MEDICINE

## 2024-03-22 PROCEDURE — 80053 COMPREHEN METABOLIC PANEL: CPT

## 2024-03-22 PROCEDURE — 85025 COMPLETE CBC W/AUTO DIFF WBC: CPT

## 2024-03-22 PROCEDURE — 87389 HIV-1 AG W/HIV-1&-2 AB AG IA: CPT

## 2024-03-22 PROCEDURE — 1160F RVW MEDS BY RX/DR IN RCRD: CPT | Mod: CPTII,,, | Performed by: FAMILY MEDICINE

## 2024-03-22 PROCEDURE — 3008F BODY MASS INDEX DOCD: CPT | Mod: CPTII,,, | Performed by: FAMILY MEDICINE

## 2024-03-22 PROCEDURE — 84153 ASSAY OF PSA TOTAL: CPT

## 2024-03-22 RX ORDER — ROSUVASTATIN CALCIUM 5 MG/1
5 TABLET, COATED ORAL DAILY
Qty: 90 TABLET | Refills: 3 | Status: SHIPPED | OUTPATIENT
Start: 2024-03-22 | End: 2024-03-22

## 2024-03-22 RX ORDER — ROSUVASTATIN CALCIUM 10 MG/1
10 TABLET, COATED ORAL DAILY
Qty: 90 TABLET | Refills: 3 | Status: SHIPPED | OUTPATIENT
Start: 2024-03-22 | End: 2025-03-22

## 2024-03-22 NOTE — PROGRESS NOTES
"Subjective:       Patient ID: Vinny Georges Jr is a 63 y.o. male.    Chief Complaint: WELLNESS      Wellness        Review of Systems   Constitutional: Negative.    Respiratory: Negative.     Cardiovascular: Negative.    Gastrointestinal: Negative.    Psychiatric/Behavioral: Negative.             Objective:      /78 (BP Location: Left arm, Patient Position: Sitting, BP Method: Large (Manual))   Pulse 60   Temp 97.2 °F (36.2 °C)   Resp 18   Ht 5' 10" (1.778 m)   Wt 82.6 kg (182 lb)   SpO2 98%   BMI 26.11 kg/m²    Physical Exam  Constitutional:       Appearance: Normal appearance.   Cardiovascular:      Rate and Rhythm: Normal rate and regular rhythm.   Pulmonary:      Effort: Pulmonary effort is normal.      Breath sounds: Normal breath sounds.   Abdominal:      General: Abdomen is flat. Bowel sounds are normal.      Palpations: Abdomen is soft.   Neurological:      Mental Status: He is alert.   Psychiatric:         Mood and Affect: Mood normal.         Behavior: Behavior normal.         Thought Content: Thought content normal.         Judgment: Judgment normal.             Recent Results (from the past 504 hour(s))   Comprehensive Metabolic Panel    Collection Time: 03/22/24  6:48 AM   Result Value Ref Range    Sodium Level 144 136 - 145 mmol/L    Potassium Level 4.0 3.5 - 5.1 mmol/L    Chloride 108 (H) 98 - 107 mmol/L    Carbon Dioxide 28 23 - 31 mmol/L    Glucose Level 88 82 - 115 mg/dL    Blood Urea Nitrogen 18.0 8.4 - 25.7 mg/dL    Creatinine 0.91 0.73 - 1.18 mg/dL    Calcium Level Total 9.7 8.8 - 10.0 mg/dL    Protein Total 7.1 5.8 - 7.6 gm/dL    Albumin Level 4.1 3.4 - 4.8 g/dL    Globulin 3.0 2.4 - 3.5 gm/dL    Albumin/Globulin Ratio 1.4 1.1 - 2.0 ratio    Bilirubin Total 0.6 <=1.5 mg/dL    Alkaline Phosphatase 97 40 - 150 unit/L    Alanine Aminotransferase 26 0 - 55 unit/L    Aspartate Aminotransferase 24 5 - 34 unit/L    eGFR >60 mls/min/1.73/m2   Lipid Panel    Collection Time: 03/22/24  " 6:48 AM   Result Value Ref Range    Cholesterol Total 230 (H) <=200 mg/dL    HDL Cholesterol 51 35 - 60 mg/dL    Triglyceride 80 34 - 140 mg/dL    Cholesterol/HDL Ratio 5 0 - 5    Very Low Density Lipoprotein 16     LDL Cholesterol 163.00 (H) 50.00 - 140.00 mg/dL   PSA, Screening    Collection Time: 03/22/24  6:48 AM   Result Value Ref Range    Prostate Specific Antigen <0.10 <=4.00 ng/mL   CBC with Differential    Collection Time: 03/22/24  6:48 AM   Result Value Ref Range    WBC 5.72 4.50 - 11.50 x10(3)/mcL    RBC 5.43 4.70 - 6.10 x10(6)/mcL    Hgb 16.0 14.0 - 18.0 g/dL    Hct 48.4 42.0 - 52.0 %    MCV 89.1 80.0 - 94.0 fL    MCH 29.5 27.0 - 31.0 pg    MCHC 33.1 33.0 - 36.0 g/dL    RDW 12.8 11.5 - 17.0 %    Platelet 251 130 - 400 x10(3)/mcL    MPV 11.4 (H) 7.4 - 10.4 fL    Neut % 66.9 %    Lymph % 18.5 %    Mono % 8.0 %    Eos % 5.4 %    Basophil % 1.0 %    Lymph # 1.06 0.6 - 4.6 x10(3)/mcL    Neut # 3.82 2.1 - 9.2 x10(3)/mcL    Mono # 0.46 0.1 - 1.3 x10(3)/mcL    Eos # 0.31 0 - 0.9 x10(3)/mcL    Baso # 0.06 <=0.2 x10(3)/mcL    IG# 0.01 0 - 0.04 x10(3)/mcL    IG% 0.2 %    NRBC% 0.0 %        Assessment:       Problem List Items Addressed This Visit    None  Visit Diagnoses       Wellness examination    -  Primary    Other hyperlipidemia        Relevant Medications    rosuvastatin (CRESTOR) 10 MG tablet               Plan:   1. Wellness examination  Lab work discussed with patient  Continue current medication  Continue diet/exercise  Return to clinic with any concerns    2. Other hyperlipidemia  -     rosuvastatin (CRESTOR) 10 MG tablet; Take 1 tablet (10 mg total) by mouth once daily.  Dispense: 90 tablet; Refill: 3  Lab work discussed with patient  Increase Crestor to 10 mg at bedtime  Diet mod  Return to clinic with any concerns

## 2024-07-05 ENCOUNTER — LAB VISIT (OUTPATIENT)
Dept: LAB | Facility: HOSPITAL | Age: 64
End: 2024-07-05
Attending: INTERNAL MEDICINE
Payer: COMMERCIAL

## 2024-07-05 DIAGNOSIS — K59.1 FUNCTIONAL DIARRHEA: Primary | ICD-10-CM

## 2024-07-05 DIAGNOSIS — K52.9 INFLAMMATORY BOWEL DISEASE: ICD-10-CM

## 2024-07-05 DIAGNOSIS — K86.81 CONGENITAL DEFICIENCY OF PANCREATIC LIPASE: ICD-10-CM

## 2024-07-05 DIAGNOSIS — K86.89 SUBCUTANEOUS NODULAR FAT NECROSIS IN PANCREATITIS: ICD-10-CM

## 2024-07-05 LAB
25(OH)D3+25(OH)D2 SERPL-MCNC: 45 NG/ML (ref 30–80)
ALBUMIN SERPL-MCNC: 4.3 G/DL (ref 3.4–4.8)
ALBUMIN/GLOB SERPL: 1.5 RATIO (ref 1.1–2)
ALP SERPL-CCNC: 90 UNIT/L (ref 40–150)
ALT SERPL-CCNC: 27 UNIT/L (ref 0–55)
ANION GAP SERPL CALC-SCNC: 10 MEQ/L
AST SERPL-CCNC: 23 UNIT/L (ref 5–34)
BASOPHILS # BLD AUTO: 0.05 X10(3)/MCL
BASOPHILS NFR BLD AUTO: 0.9 %
BILIRUB SERPL-MCNC: 0.5 MG/DL
BUN SERPL-MCNC: 18 MG/DL (ref 8.4–25.7)
CALCIUM SERPL-MCNC: 10.3 MG/DL (ref 8.8–10)
CHLORIDE SERPL-SCNC: 108 MMOL/L (ref 98–107)
CHOLEST SERPL-MCNC: 183 MG/DL
CHOLEST/HDLC SERPL: 4 {RATIO} (ref 0–5)
CO2 SERPL-SCNC: 27 MMOL/L (ref 23–31)
CREAT SERPL-MCNC: 1.01 MG/DL (ref 0.73–1.18)
CREAT/UREA NIT SERPL: 18
EOSINOPHIL # BLD AUTO: 0.28 X10(3)/MCL (ref 0–0.9)
EOSINOPHIL NFR BLD AUTO: 4.9 %
ERYTHROCYTE [DISTWIDTH] IN BLOOD BY AUTOMATED COUNT: 12.7 % (ref 11.5–17)
GFR SERPLBLD CREATININE-BSD FMLA CKD-EPI: >60 ML/MIN/1.73/M2
GLOBULIN SER-MCNC: 2.9 GM/DL (ref 2.4–3.5)
GLUCOSE SERPL-MCNC: 93 MG/DL (ref 82–115)
HCT VFR BLD AUTO: 49.3 % (ref 42–52)
HDLC SERPL-MCNC: 51 MG/DL (ref 35–60)
HGB BLD-MCNC: 16.2 G/DL (ref 14–18)
IMM GRANULOCYTES # BLD AUTO: 0 X10(3)/MCL (ref 0–0.04)
IMM GRANULOCYTES NFR BLD AUTO: 0 %
LDLC SERPL CALC-MCNC: 113 MG/DL (ref 50–140)
LYMPHOCYTES # BLD AUTO: 0.92 X10(3)/MCL (ref 0.6–4.6)
LYMPHOCYTES NFR BLD AUTO: 16.3 %
MCH RBC QN AUTO: 29.2 PG (ref 27–31)
MCHC RBC AUTO-ENTMCNC: 32.9 G/DL (ref 33–36)
MCV RBC AUTO: 89 FL (ref 80–94)
MONOCYTES # BLD AUTO: 0.34 X10(3)/MCL (ref 0.1–1.3)
MONOCYTES NFR BLD AUTO: 6 %
NEUTROPHILS # BLD AUTO: 4.07 X10(3)/MCL (ref 2.1–9.2)
NEUTROPHILS NFR BLD AUTO: 71.9 %
NRBC BLD AUTO-RTO: 0 %
PLATELET # BLD AUTO: 236 X10(3)/MCL (ref 130–400)
PMV BLD AUTO: 11.2 FL (ref 7.4–10.4)
POTASSIUM SERPL-SCNC: 4.6 MMOL/L (ref 3.5–5.1)
PROT SERPL-MCNC: 7.2 GM/DL (ref 5.8–7.6)
RBC # BLD AUTO: 5.54 X10(6)/MCL (ref 4.7–6.1)
SODIUM SERPL-SCNC: 145 MMOL/L (ref 136–145)
TRIGL SERPL-MCNC: 97 MG/DL (ref 34–140)
VLDLC SERPL CALC-MCNC: 19 MG/DL
WBC # BLD AUTO: 5.66 X10(3)/MCL (ref 4.5–11.5)

## 2024-07-05 PROCEDURE — 84446 ASSAY OF VITAMIN E: CPT

## 2024-07-05 PROCEDURE — 82306 VITAMIN D 25 HYDROXY: CPT

## 2024-07-05 PROCEDURE — 80061 LIPID PANEL: CPT

## 2024-07-05 PROCEDURE — 84597 ASSAY OF VITAMIN K: CPT

## 2024-07-05 PROCEDURE — 80053 COMPREHEN METABOLIC PANEL: CPT

## 2024-07-05 PROCEDURE — 85025 COMPLETE CBC W/AUTO DIFF WBC: CPT

## 2024-07-05 PROCEDURE — 36415 COLL VENOUS BLD VENIPUNCTURE: CPT

## 2024-07-05 PROCEDURE — 84590 ASSAY OF VITAMIN A: CPT

## 2024-07-08 LAB
A-TOCOPHEROL VIT E SERPL-MCNC: 10.4 MG/L (ref 5.5–17)
VIT A SERPL-MCNC: 64.7 MCG/DL (ref 32.5–78)

## 2024-07-10 LAB — PHYTONADIONE SERPL-MCNC: 0.39 NG/ML (ref 0.1–2.2)

## 2024-08-16 ENCOUNTER — LAB VISIT (OUTPATIENT)
Dept: LAB | Facility: HOSPITAL | Age: 64
End: 2024-08-16
Attending: INTERNAL MEDICINE
Payer: COMMERCIAL

## 2024-08-16 DIAGNOSIS — E83.52 HIGH CALCIUM LEVELS: Primary | ICD-10-CM

## 2024-08-16 LAB
ALBUMIN SERPL-MCNC: 4.1 G/DL (ref 3.4–4.8)
ALBUMIN/GLOB SERPL: 1.5 RATIO (ref 1.1–2)
ALP SERPL-CCNC: 86 UNIT/L (ref 40–150)
ALT SERPL-CCNC: 29 UNIT/L (ref 0–55)
ANION GAP SERPL CALC-SCNC: 6 MEQ/L
AST SERPL-CCNC: 26 UNIT/L (ref 5–34)
BILIRUB SERPL-MCNC: 0.6 MG/DL
BUN SERPL-MCNC: 17 MG/DL (ref 8.4–25.7)
CALCIUM SERPL-MCNC: 9.7 MG/DL (ref 8.8–10)
CHLORIDE SERPL-SCNC: 109 MMOL/L (ref 98–107)
CO2 SERPL-SCNC: 28 MMOL/L (ref 23–31)
CREAT SERPL-MCNC: 0.86 MG/DL (ref 0.73–1.18)
CREAT/UREA NIT SERPL: 20
GFR SERPLBLD CREATININE-BSD FMLA CKD-EPI: >60 ML/MIN/1.73/M2
GLOBULIN SER-MCNC: 2.7 GM/DL (ref 2.4–3.5)
GLUCOSE SERPL-MCNC: 85 MG/DL (ref 82–115)
POTASSIUM SERPL-SCNC: 3.9 MMOL/L (ref 3.5–5.1)
PROT SERPL-MCNC: 6.8 GM/DL (ref 5.8–7.6)
SODIUM SERPL-SCNC: 143 MMOL/L (ref 136–145)

## 2024-08-16 PROCEDURE — 80053 COMPREHEN METABOLIC PANEL: CPT

## 2024-08-16 PROCEDURE — 36415 COLL VENOUS BLD VENIPUNCTURE: CPT

## 2024-11-05 RX ORDER — AZITHROMYCIN 250 MG/1
TABLET, FILM COATED ORAL
Qty: 6 TABLET | Refills: 0 | Status: SHIPPED | OUTPATIENT
Start: 2024-11-05

## 2025-03-17 DIAGNOSIS — E78.49 OTHER HYPERLIPIDEMIA: ICD-10-CM

## 2025-03-17 DIAGNOSIS — Z00.00 WELLNESS EXAMINATION: Primary | ICD-10-CM

## 2025-03-17 DIAGNOSIS — Z12.5 SCREENING FOR MALIGNANT NEOPLASM OF PROSTATE: ICD-10-CM

## 2025-03-17 DIAGNOSIS — I10 HYPERTENSION, UNSPECIFIED TYPE: ICD-10-CM

## 2025-03-17 DIAGNOSIS — E78.5 HYPERLIPIDEMIA, UNSPECIFIED HYPERLIPIDEMIA TYPE: ICD-10-CM

## 2025-04-17 ENCOUNTER — OFFICE VISIT (OUTPATIENT)
Dept: FAMILY MEDICINE | Facility: CLINIC | Age: 65
End: 2025-04-17
Payer: COMMERCIAL

## 2025-04-17 ENCOUNTER — LAB VISIT (OUTPATIENT)
Dept: LAB | Facility: HOSPITAL | Age: 65
End: 2025-04-17
Attending: FAMILY MEDICINE
Payer: COMMERCIAL

## 2025-04-17 VITALS
OXYGEN SATURATION: 94 % | TEMPERATURE: 97 F | WEIGHT: 189 LBS | SYSTOLIC BLOOD PRESSURE: 114 MMHG | RESPIRATION RATE: 18 BRPM | BODY MASS INDEX: 27.06 KG/M2 | DIASTOLIC BLOOD PRESSURE: 78 MMHG | HEIGHT: 70 IN | HEART RATE: 70 BPM

## 2025-04-17 DIAGNOSIS — Z00.00 WELLNESS EXAMINATION: Primary | ICD-10-CM

## 2025-04-17 DIAGNOSIS — C61 PROSTATE CANCER: ICD-10-CM

## 2025-04-17 DIAGNOSIS — E78.5 HYPERLIPIDEMIA, UNSPECIFIED HYPERLIPIDEMIA TYPE: ICD-10-CM

## 2025-04-17 DIAGNOSIS — E78.49 OTHER HYPERLIPIDEMIA: ICD-10-CM

## 2025-04-17 DIAGNOSIS — Z12.5 SCREENING FOR MALIGNANT NEOPLASM OF PROSTATE: ICD-10-CM

## 2025-04-17 DIAGNOSIS — Z00.00 WELLNESS EXAMINATION: ICD-10-CM

## 2025-04-17 DIAGNOSIS — I10 HYPERTENSION, UNSPECIFIED TYPE: ICD-10-CM

## 2025-04-17 LAB
ALBUMIN SERPL-MCNC: 4 G/DL (ref 3.4–4.8)
ALBUMIN/GLOB SERPL: 1.3 RATIO (ref 1.1–2)
ALP SERPL-CCNC: 81 UNIT/L (ref 40–150)
ALT SERPL-CCNC: 34 UNIT/L (ref 0–55)
ANION GAP SERPL CALC-SCNC: 7 MEQ/L
AST SERPL-CCNC: 23 UNIT/L (ref 11–45)
BASOPHILS # BLD AUTO: 0.04 X10(3)/MCL
BASOPHILS NFR BLD AUTO: 0.7 %
BILIRUB SERPL-MCNC: 0.6 MG/DL
BUN SERPL-MCNC: 20 MG/DL (ref 8.4–25.7)
CALCIUM SERPL-MCNC: 9.2 MG/DL (ref 8.8–10)
CHLORIDE SERPL-SCNC: 109 MMOL/L (ref 98–107)
CHOLEST SERPL-MCNC: 158 MG/DL
CHOLEST/HDLC SERPL: 3 {RATIO} (ref 0–5)
CO2 SERPL-SCNC: 27 MMOL/L (ref 23–31)
CREAT SERPL-MCNC: 0.84 MG/DL (ref 0.72–1.25)
CREAT/UREA NIT SERPL: 24
EOSINOPHIL # BLD AUTO: 0.31 X10(3)/MCL (ref 0–0.9)
EOSINOPHIL NFR BLD AUTO: 5.5 %
ERYTHROCYTE [DISTWIDTH] IN BLOOD BY AUTOMATED COUNT: 12.6 % (ref 11.5–17)
GFR SERPLBLD CREATININE-BSD FMLA CKD-EPI: >60 ML/MIN/1.73/M2
GLOBULIN SER-MCNC: 3 GM/DL (ref 2.4–3.5)
GLUCOSE SERPL-MCNC: 86 MG/DL (ref 82–115)
HCT VFR BLD AUTO: 46.6 % (ref 42–52)
HDLC SERPL-MCNC: 48 MG/DL (ref 35–60)
HGB BLD-MCNC: 15.4 G/DL (ref 14–18)
IMM GRANULOCYTES # BLD AUTO: 0.01 X10(3)/MCL (ref 0–0.04)
IMM GRANULOCYTES NFR BLD AUTO: 0.2 %
LDLC SERPL CALC-MCNC: 98 MG/DL (ref 50–140)
LYMPHOCYTES # BLD AUTO: 1.11 X10(3)/MCL (ref 0.6–4.6)
LYMPHOCYTES NFR BLD AUTO: 19.6 %
MCH RBC QN AUTO: 29.5 PG (ref 27–31)
MCHC RBC AUTO-ENTMCNC: 33 G/DL (ref 33–36)
MCV RBC AUTO: 89.3 FL (ref 80–94)
MONOCYTES # BLD AUTO: 0.39 X10(3)/MCL (ref 0.1–1.3)
MONOCYTES NFR BLD AUTO: 6.9 %
NEUTROPHILS # BLD AUTO: 3.79 X10(3)/MCL (ref 2.1–9.2)
NEUTROPHILS NFR BLD AUTO: 67.1 %
NRBC BLD AUTO-RTO: 0 %
PLATELET # BLD AUTO: 218 X10(3)/MCL (ref 130–400)
PMV BLD AUTO: 11.6 FL (ref 7.4–10.4)
POTASSIUM SERPL-SCNC: 4.3 MMOL/L (ref 3.5–5.1)
PROT SERPL-MCNC: 7 GM/DL (ref 5.8–7.6)
PSA SERPL-MCNC: <0.1 NG/ML
RBC # BLD AUTO: 5.22 X10(6)/MCL (ref 4.7–6.1)
SODIUM SERPL-SCNC: 143 MMOL/L (ref 136–145)
TRIGL SERPL-MCNC: 62 MG/DL (ref 34–140)
VLDLC SERPL CALC-MCNC: 12 MG/DL
WBC # BLD AUTO: 5.65 X10(3)/MCL (ref 4.5–11.5)

## 2025-04-17 PROCEDURE — 36415 COLL VENOUS BLD VENIPUNCTURE: CPT

## 2025-04-17 PROCEDURE — 84153 ASSAY OF PSA TOTAL: CPT

## 2025-04-17 PROCEDURE — 80061 LIPID PANEL: CPT

## 2025-04-17 PROCEDURE — 80053 COMPREHEN METABOLIC PANEL: CPT

## 2025-04-17 PROCEDURE — 85025 COMPLETE CBC W/AUTO DIFF WBC: CPT

## 2025-04-17 RX ORDER — ROSUVASTATIN CALCIUM 10 MG/1
10 TABLET, COATED ORAL DAILY
Qty: 90 TABLET | Refills: 3 | Status: SHIPPED | OUTPATIENT
Start: 2025-04-17 | End: 2026-04-17

## 2025-04-17 RX ORDER — TADALAFIL 20 MG/1
20 TABLET ORAL
COMMUNITY
Start: 2025-02-17

## 2025-04-17 RX ORDER — PANCRELIPASE LIPASE, PANCRELIPASE PROTEASE, PANCRELIPASE AMYLASE 40000; 126000; 168000 [USP'U]/1; [USP'U]/1; [USP'U]/1
CAPSULE, DELAYED RELEASE ORAL
COMMUNITY
Start: 2025-04-01

## 2025-04-17 NOTE — PROGRESS NOTES
"Subjective:     Patient ID: Vinny Georges Jr is a 64 y.o. male.    Chief Complaint: Annual Exam        History of Present Illness    CHIEF COMPLAINT:  Patient presents today for follow up    EXERCISE:  He previously maintained a daily walking routine of approximately 8,000 steps (4 miles), including 5,000 steps during work hours. He discontinued this exercise regimen about a month ago but expresses desire to resume.    MEDICATIONS:  He takes CoQ10 for management of aching symptoms.    LABS:  Cholesterol decreased from 270 to 158. Triglycerides are 62, below normal range of 150. HDL is 48, above minimum of 35. PSA is less than 0.1. CBC is normal without anemia and adequate platelet count. Kidney and liver function tests are normal. Blood sugar is 86, below normal range of 129.      ROS:  Musculoskeletal: no muscle pain            Review of Systems   Constitutional: Negative.    Respiratory: Negative.     Cardiovascular: Negative.    Gastrointestinal: Negative.    Psychiatric/Behavioral: Negative.         Objective:     /78   Pulse 70   Temp 97.2 °F (36.2 °C) (Temporal)   Resp 18   Ht 5' 9.69" (1.77 m)   Wt 85.7 kg (189 lb)   SpO2 (!) 94%   BMI 27.36 kg/m²    Physical Exam  Constitutional:       Appearance: Normal appearance.   Cardiovascular:      Rate and Rhythm: Normal rate and regular rhythm.   Pulmonary:      Effort: Pulmonary effort is normal.      Breath sounds: Normal breath sounds.   Abdominal:      General: Abdomen is flat. Bowel sounds are normal.      Palpations: Abdomen is soft.   Neurological:      Mental Status: He is alert.   Psychiatric:         Mood and Affect: Mood normal.         Behavior: Behavior normal.         Thought Content: Thought content normal.         Judgment: Judgment normal.       Recent Results (from the past 3 weeks)   Comprehensive Metabolic Panel    Collection Time: 04/17/25  7:17 AM   Result Value Ref Range    Sodium 143 136 - 145 mmol/L    Potassium 4.3 3.5 - 5.1 " mmol/L    Chloride 109 (H) 98 - 107 mmol/L    CO2 27 23 - 31 mmol/L    Glucose 86 82 - 115 mg/dL    Blood Urea Nitrogen 20.0 8.4 - 25.7 mg/dL    Creatinine 0.84 0.72 - 1.25 mg/dL    Calcium 9.2 8.8 - 10.0 mg/dL    Protein Total 7.0 5.8 - 7.6 gm/dL    Albumin 4.0 3.4 - 4.8 g/dL    Globulin 3.0 2.4 - 3.5 gm/dL    Albumin/Globulin Ratio 1.3 1.1 - 2.0 ratio    Bilirubin Total 0.6 <=1.5 mg/dL    ALP 81 40 - 150 unit/L    ALT 34 0 - 55 unit/L    AST 23 11 - 45 unit/L    eGFR >60 mL/min/1.73/m2    Anion Gap 7.0 mEq/L    BUN/Creatinine Ratio 24    Lipid Panel    Collection Time: 04/17/25  7:17 AM   Result Value Ref Range    Cholesterol Total 158 <=200 mg/dL    HDL Cholesterol 48 35 - 60 mg/dL    Triglyceride 62 34 - 140 mg/dL    Cholesterol/HDL Ratio 3 0 - 5    Very Low Density Lipoprotein 12     LDL Cholesterol 98.00 50.00 - 140.00 mg/dL   PSA, Screening    Collection Time: 04/17/25  7:17 AM   Result Value Ref Range    Prostate Specific Antigen <0.10 <=4.00 ng/mL   CBC with Differential    Collection Time: 04/17/25  7:17 AM   Result Value Ref Range    WBC 5.65 4.50 - 11.50 x10(3)/mcL    RBC 5.22 4.70 - 6.10 x10(6)/mcL    Hgb 15.4 14.0 - 18.0 g/dL    Hct 46.6 42.0 - 52.0 %    MCV 89.3 80.0 - 94.0 fL    MCH 29.5 27.0 - 31.0 pg    MCHC 33.0 33.0 - 36.0 g/dL    RDW 12.6 11.5 - 17.0 %    Platelet 218 130 - 400 x10(3)/mcL    MPV 11.6 (H) 7.4 - 10.4 fL    Neut % 67.1 %    Lymph % 19.6 %    Mono % 6.9 %    Eos % 5.5 %    Basophil % 0.7 %    Imm Grans % 0.2 %    Neut # 3.79 2.1 - 9.2 x10(3)/mcL    Lymph # 1.11 0.6 - 4.6 x10(3)/mcL    Mono # 0.39 0.1 - 1.3 x10(3)/mcL    Eos # 0.31 0 - 0.9 x10(3)/mcL    Baso # 0.04 <=0.2 x10(3)/mcL    Imm Gran # 0.01 0.00 - 0.04 x10(3)/mcL    NRBC% 0.0 %            Assessment:     Problem List Items Addressed This Visit          Oncology    Prostate cancer    Current Assessment & Plan   Followed by Dr. Jernigan   Keep scheduled appointment          Other Visit Diagnoses         Wellness examination     -  Primary      Other hyperlipidemia        Relevant Medications    rosuvastatin (CRESTOR) 10 MG tablet             Plan:   1. Wellness examination  Lab work discussed with patient  Continue current medication  Continue diet/exercise  Return to clinic with any concerns    2. Other hyperlipidemia  -     rosuvastatin (CRESTOR) 10 MG tablet; Take 1 tablet (10 mg total) by mouth once daily.  Dispense: 90 tablet; Refill: 3  Controlled  Continue current Rx medication  Return to clinic with any concerns    3. Prostate cancer  Assessment & Plan:  Followed by Dr. Jernigan   Keep scheduled appointment                     This note was generated with the assistance of ambient listening technology. Verbal consent was obtained by the patient and accompanying visitor(s) for the recording of patient appointment to facilitate this note. I attest to having reviewed and edited the generated note for accuracy, though some syntax or spelling errors may persist. Please contact the author of this note for any clarification.